# Patient Record
Sex: FEMALE | Race: WHITE | NOT HISPANIC OR LATINO
[De-identification: names, ages, dates, MRNs, and addresses within clinical notes are randomized per-mention and may not be internally consistent; named-entity substitution may affect disease eponyms.]

---

## 2021-01-29 ENCOUNTER — TRANSCRIPTION ENCOUNTER (OUTPATIENT)
Age: 36
End: 2021-01-29

## 2021-03-01 ENCOUNTER — TRANSCRIPTION ENCOUNTER (OUTPATIENT)
Age: 36
End: 2021-03-01

## 2021-03-11 ENCOUNTER — TRANSCRIPTION ENCOUNTER (OUTPATIENT)
Age: 36
End: 2021-03-11

## 2021-04-26 ENCOUNTER — TRANSCRIPTION ENCOUNTER (OUTPATIENT)
Age: 36
End: 2021-04-26

## 2021-06-21 ENCOUNTER — TRANSCRIPTION ENCOUNTER (OUTPATIENT)
Age: 36
End: 2021-06-21

## 2021-07-02 ENCOUNTER — TRANSCRIPTION ENCOUNTER (OUTPATIENT)
Age: 36
End: 2021-07-02

## 2021-07-16 ENCOUNTER — TRANSCRIPTION ENCOUNTER (OUTPATIENT)
Age: 36
End: 2021-07-16

## 2021-08-02 ENCOUNTER — TRANSCRIPTION ENCOUNTER (OUTPATIENT)
Age: 36
End: 2021-08-02

## 2022-11-30 ENCOUNTER — APPOINTMENT (OUTPATIENT)
Dept: SURGERY | Facility: CLINIC | Age: 37
End: 2022-11-30

## 2022-11-30 PROCEDURE — 99205K: CUSTOM

## 2023-04-11 ENCOUNTER — APPOINTMENT (OUTPATIENT)
Dept: OTOLARYNGOLOGY | Facility: CLINIC | Age: 38
End: 2023-04-11
Payer: COMMERCIAL

## 2023-04-11 ENCOUNTER — NON-APPOINTMENT (OUTPATIENT)
Age: 38
End: 2023-04-11

## 2023-04-11 VITALS
WEIGHT: 115 LBS | TEMPERATURE: 98.8 F | BODY MASS INDEX: 17.23 KG/M2 | SYSTOLIC BLOOD PRESSURE: 113 MMHG | DIASTOLIC BLOOD PRESSURE: 98 MMHG | OXYGEN SATURATION: 98 % | HEIGHT: 68.5 IN | HEART RATE: 108 BPM

## 2023-04-11 DIAGNOSIS — F17.200 NICOTINE DEPENDENCE, UNSPECIFIED, UNCOMPLICATED: ICD-10-CM

## 2023-04-11 DIAGNOSIS — Z87.09 PERSONAL HISTORY OF OTHER DISEASES OF THE RESPIRATORY SYSTEM: ICD-10-CM

## 2023-04-11 DIAGNOSIS — Z82.49 FAMILY HISTORY OF ISCHEMIC HEART DISEASE AND OTHER DISEASES OF THE CIRCULATORY SYSTEM: ICD-10-CM

## 2023-04-11 DIAGNOSIS — Z87.891 PERSONAL HISTORY OF NICOTINE DEPENDENCE: ICD-10-CM

## 2023-04-11 DIAGNOSIS — Z78.9 OTHER SPECIFIED HEALTH STATUS: ICD-10-CM

## 2023-04-11 PROBLEM — Z00.00 ENCOUNTER FOR PREVENTIVE HEALTH EXAMINATION: Status: ACTIVE | Noted: 2023-04-11

## 2023-04-11 PROCEDURE — 31579 LARYNGOSCOPY TELESCOPIC: CPT

## 2023-04-11 PROCEDURE — 99205 OFFICE O/P NEW HI 60 MIN: CPT | Mod: 25

## 2023-04-11 PROCEDURE — 99072 ADDL SUPL MATRL&STAF TM PHE: CPT

## 2023-04-11 RX ORDER — BENZONATATE 100 MG/1
100 CAPSULE ORAL
Qty: 90 | Refills: 1 | Status: ACTIVE | COMMUNITY
Start: 2023-04-11 | End: 1900-01-01

## 2023-04-11 RX ORDER — METHYLPREDNISOLONE 4 MG/1
4 TABLET ORAL
Qty: 1 | Refills: 0 | Status: ACTIVE | COMMUNITY
Start: 2023-04-11 | End: 1900-01-01

## 2023-04-12 NOTE — PROCEDURE
[de-identified] : -\par Procedure: Flexible Laryngoscopy with Stroboscopy\par \par Pre-operative Diagnosis: dysphonia \par Post-operative Diagnosis: pre-nodules on anterior 1/3 of bilateral TVF with inflammation \par Anesthesia: Topical - 1% Lidocaine/Phenylephrine \par \par Procedure Details: \par The patient was placed in the sitting position. After decongestant and anesthesia were applied the laryngoscope was passed. The nasal cavities, nasopharynx, oropharynx, hypopharynx, and larynx were all examined. Vocal folds were examined during respiration and phonation. The following findings were noted:\par \par Findings: \par Nose: Septum is deviated to right, turbinates are normal, nasal airways patent, mucosa normal\par Nasopharynx: Adenoids normal, no masses, eustachian tube normal\par Oropharynx: Pharyngeal walls symmetric and without lesion. Tonsils/fossae symmetric\par Hypopharynx: Hypopharynx and pyriform sinuses without lesion. No masses or asymmetry. No pooling of secretions.\par Larynx: Epiglottis and aryepiglottic folds were sharp and crisp bilaterally. Bilateral false vocal folds normal appearance. Airway was widely patent.\par \par Strobe Exam Ratings\par 		\par TVF Appearance: bilateral pre-nodules with inflammation \par TVF Mobility: normal mobility bilaterally \par Edema/hypertrophy: + \par Mucus on TVF: none\par Glottic Closure: glottic gap \par Mucosal Wave: reduced\par Amplitude of Vibration: reduced\par Phase: asymmetric \par Supraglottic Hyperfunction: +\par Other Findings:\par \par Condition: Stable. Patient tolerated procedure well.\par \par Complications: None\par \par

## 2023-04-12 NOTE — ASSESSMENT
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 On videostroboscopy, there is evidence of pre-nodules on bilateral TVF with erythema and edema consistent with laryngitis. Based on her history and exam findings, I am recommending Medrol Dosepak, voice rest, increased hydration and SLP consultation. Follow up 1 week to assess for improvement prior to resuming work.\par \par - Medrol Dosepak\par - voice rest, voice hygiene and increased hydration \par - SLP consultation

## 2023-04-12 NOTE — HISTORY OF PRESENT ILLNESS
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 She feels that her range is decreased and her voice is more hoarse/raspy and breathy. Associated symptoms include dry cough. Denies difficulty chewing, eating or swallowing. No breathing issues. 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 This has never happened to her before. She's tried Delsym and Robitussin which has not helped. She is drinking about 8 glasses of water per day. No other ENT issues.

## 2023-04-17 ENCOUNTER — APPOINTMENT (OUTPATIENT)
Dept: OTOLARYNGOLOGY | Facility: CLINIC | Age: 38
End: 2023-04-17
Payer: COMMERCIAL

## 2023-04-17 VITALS
WEIGHT: 115 LBS | HEART RATE: 80 BPM | HEIGHT: 68.5 IN | BODY MASS INDEX: 17.23 KG/M2 | DIASTOLIC BLOOD PRESSURE: 70 MMHG | SYSTOLIC BLOOD PRESSURE: 116 MMHG | OXYGEN SATURATION: 98 % | TEMPERATURE: 98 F

## 2023-04-17 DIAGNOSIS — R05.9 COUGH, UNSPECIFIED: ICD-10-CM

## 2023-04-17 DIAGNOSIS — J04.0 ACUTE LARYNGITIS: ICD-10-CM

## 2023-04-17 DIAGNOSIS — R49.0 DYSPHONIA: ICD-10-CM

## 2023-04-17 PROCEDURE — 99072 ADDL SUPL MATRL&STAF TM PHE: CPT

## 2023-04-17 PROCEDURE — 99215 OFFICE O/P EST HI 40 MIN: CPT | Mod: 25

## 2023-04-17 PROCEDURE — 31579 LARYNGOSCOPY TELESCOPIC: CPT

## 2023-04-17 NOTE — PHYSICAL EXAM
[Normal] : normal appearance, well groomed, well nourished, and in no acute distress [FreeTextEntry1] :   Improved clarity, pitch control and range

## 2023-04-17 NOTE — ASSESSMENT
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 On videostroboscopy, there is evidence of pre-nodules on bilateral TVF with erythema and edema consistent with laryngitis. Based on her history and exam findings, I am recommending Medrol Dosepak, voice rest, increased hydration and SLP consultation. Follow up 1 week to assess for improvement prior to resuming work.\par \par 4/17/23:  patient followed the instructions from above.  Notes a greater than 50% improvement in symptoms.  Exam today also notes some resolution of the inflammation and the areas of laryngitis.  At this time I am recommending continuing this regimen.   We will try to set her up with speech pathology in the near future.   if voice further improves this week patient can return to work.\par \par - Finish Medrol Dosepak\par - voice rest, voice hygiene and increased hydration \par - SLP consultation \par - follow up after SLP

## 2023-04-17 NOTE — HISTORY OF PRESENT ILLNESS
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 She feels that her range is decreased and her voice is more hoarse/raspy and breathy. Associated symptoms include dry cough. Denies difficulty chewing, eating or swallowing. No breathing issues. 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 This has never happened to her before. She's tried Delsym and Robitussin which has not helped. She is drinking about 8 glasses of water per day. No other ENT issues. \par  - [FreeTextEntry1] : 4/17/23\par  the patient has been under strict voice rest since the last visit.  She is completing the Medrol Dosepak.  She is increased hydration.  She notes a greater than 50% improvement in terms of her voice.  She notes that she did have 1 autograph signing over the past week.  No new ear, nose, throat symptoms otherwise.

## 2023-04-17 NOTE — PROCEDURE
[de-identified] : -\par Procedure: Flexible Laryngoscopy with Stroboscopy\par \par Pre-operative Diagnosis: dysphonia \par Post-operative Diagnosis: pre-nodules on anterior 1/3 of bilateral TVF with inflammation, improved from previous with some resolution \par Anesthesia: Topical - 1% Lidocaine/Phenylephrine \par \par Procedure Details: \par The patient was placed in the sitting position. After decongestant and anesthesia were applied the laryngoscope was passed. The nasal cavities, nasopharynx, oropharynx, hypopharynx, and larynx were all examined. Vocal folds were examined during respiration and phonation. The following findings were noted:\par \par Findings: \par Nose: Septum is deviated to right, turbinates are normal, nasal airways patent, mucosa normal\par Nasopharynx: Adenoids normal, no masses, eustachian tube normal\par Oropharynx: Pharyngeal walls symmetric and without lesion. Tonsils/fossae symmetric\par Hypopharynx: Hypopharynx and pyriform sinuses without lesion. No masses or asymmetry. No pooling of secretions.\par Larynx: Epiglottis and aryepiglottic folds were sharp and crisp bilaterally. Bilateral false vocal folds normal appearance. Airway was widely patent.\par \par Strobe Exam Ratings\par 		\par TVF Appearance: bilateral pre-nodules with inflammation, improved from previous with some resolution\par TVF Mobility: normal mobility bilaterally \par Edema/hypertrophy: + \par Mucus on TVF: none\par Glottic Closure: none\par Mucosal Wave: slightly reduced\par Amplitude of Vibration: slightly reduced\par Phase: symmetric\par Supraglottic Hyperfunction: +\par Other Findings:\par \par Condition: Stable. Patient tolerated procedure well.\par \par Complications: None\par \par . \par

## 2023-04-20 ENCOUNTER — APPOINTMENT (OUTPATIENT)
Dept: OTOLARYNGOLOGY | Facility: CLINIC | Age: 38
End: 2023-04-20
Payer: COMMERCIAL

## 2023-04-20 PROCEDURE — 92524 BEHAVRAL QUALIT ANALYS VOICE: CPT | Mod: GN

## 2023-04-20 PROCEDURE — 99072 ADDL SUPL MATRL&STAF TM PHE: CPT
